# Patient Record
Sex: MALE | Race: NATIVE HAWAIIAN OR OTHER PACIFIC ISLANDER | URBAN - METROPOLITAN AREA
[De-identification: names, ages, dates, MRNs, and addresses within clinical notes are randomized per-mention and may not be internally consistent; named-entity substitution may affect disease eponyms.]

---

## 2022-04-26 ENCOUNTER — OFFICE VISIT (OUTPATIENT)
Dept: URBAN - METROPOLITAN AREA CLINIC 98 | Facility: CLINIC | Age: 42
End: 2022-04-26

## 2022-04-26 ENCOUNTER — WEB ENCOUNTER (OUTPATIENT)
Dept: URBAN - METROPOLITAN AREA CLINIC 98 | Facility: CLINIC | Age: 42
End: 2022-04-26

## 2022-04-26 VITALS
DIASTOLIC BLOOD PRESSURE: 77 MMHG | SYSTOLIC BLOOD PRESSURE: 120 MMHG | HEART RATE: 66 BPM | HEIGHT: 63 IN | TEMPERATURE: 97.3 F | WEIGHT: 142 LBS | BODY MASS INDEX: 25.16 KG/M2

## 2022-04-26 DIAGNOSIS — R10.13 EPIGASTRIC PAIN: ICD-10-CM

## 2022-04-26 DIAGNOSIS — K62.5 RECTAL BLEEDING: ICD-10-CM

## 2022-04-26 PROCEDURE — 99203 OFFICE O/P NEW LOW 30 MIN: CPT | Performed by: INTERNAL MEDICINE

## 2022-04-26 NOTE — HPI-TODAY'S VISIT:
Patient does not speak English EGD and colon done 2018 Had [facility] had h pylori breath test, negative pain epigastrium LUQ after meals

## 2022-08-23 ENCOUNTER — OFFICE VISIT (OUTPATIENT)
Dept: URBAN - METROPOLITAN AREA CLINIC 98 | Facility: CLINIC | Age: 42
End: 2022-08-23

## 2022-08-23 ENCOUNTER — WEB ENCOUNTER (OUTPATIENT)
Dept: URBAN - METROPOLITAN AREA CLINIC 98 | Facility: CLINIC | Age: 42
End: 2022-08-23

## 2022-08-23 VITALS
DIASTOLIC BLOOD PRESSURE: 63 MMHG | TEMPERATURE: 98.1 F | WEIGHT: 142.8 LBS | HEART RATE: 73 BPM | BODY MASS INDEX: 25.3 KG/M2 | SYSTOLIC BLOOD PRESSURE: 135 MMHG | HEIGHT: 63 IN

## 2022-08-23 DIAGNOSIS — K62.89 RECTAL PAIN: ICD-10-CM

## 2022-08-23 DIAGNOSIS — L29.0 PERIANAL PRURITUS: ICD-10-CM

## 2022-08-23 PROCEDURE — 99214 OFFICE O/P EST MOD 30 MIN: CPT | Performed by: INTERNAL MEDICINE

## 2022-08-23 RX ORDER — ANORECTAL OINTMENT 15.7; .44; 24; 20.6 G/100G; G/100G; G/100G; G/100G
AS DIRECTED OINTMENT TOPICAL TWICE A DAY
Qty: 10 | Refills: 1 | OUTPATIENT
Start: 2022-08-23 | End: 2022-09-20

## 2022-08-23 RX ORDER — SODIUM, POTASSIUM,MAG SULFATES 17.5-3.13G
354ML SOLUTION, RECONSTITUTED, ORAL ORAL
Qty: 345 MILLILITER | Refills: 0 | OUTPATIENT
Start: 2022-08-23 | End: 2022-08-24

## 2022-08-23 NOTE — HPI-TODAY'S VISIT:
Pt seen by Dr. Zhu. 43 yo pt w several weeks of perianal burning, w intermittent rectal bleeding, mostly on toilet paper w defecatory perianal discomfort, exacerbated by sitting, wo pain w defecation, occasional loose stools, w normal bm's qd mostly and no abdominal pain. He has a good appetite and no weight loss. Normal labs at [facility], no copy available. Lately, some LBP at times. Negative FIT @ [facility] No COVID-19 exposure and hasn't received COVID-19 vaccine. No other complaints.

## 2022-08-23 NOTE — PHYSICAL EXAM CONSTITUTIONAL:
well developed, well nourished , in no acute distress , ambulating without difficulty , normal communication ability
Retired

## 2022-08-24 ENCOUNTER — DASHBOARD ENCOUNTERS (OUTPATIENT)
Age: 42
End: 2022-08-24

## 2022-11-01 ENCOUNTER — TELEPHONE ENCOUNTER (OUTPATIENT)
Dept: URBAN - METROPOLITAN AREA CLINIC 63 | Facility: CLINIC | Age: 42
End: 2022-11-01

## 2022-11-21 ENCOUNTER — CLAIMS CREATED FROM THE CLAIM WINDOW (OUTPATIENT)
Dept: URBAN - METROPOLITAN AREA SURGERY CENTER 18 | Facility: SURGERY CENTER | Age: 42
End: 2022-11-21

## 2022-11-21 DIAGNOSIS — K62.89 ACUTE PROCTITIS: ICD-10-CM

## 2022-11-21 PROCEDURE — 45378 DIAGNOSTIC COLONOSCOPY: CPT | Performed by: INTERNAL MEDICINE

## 2022-11-21 PROCEDURE — G8907 PT DOC NO EVENTS ON DISCHARG: HCPCS | Performed by: INTERNAL MEDICINE

## 2022-12-05 ENCOUNTER — TELEPHONE ENCOUNTER (OUTPATIENT)
Dept: URBAN - METROPOLITAN AREA CLINIC 98 | Facility: CLINIC | Age: 42
End: 2022-12-05

## 2022-12-05 RX ORDER — HYDROCORTISONE ACETATE 25 MG/1
1 SUPPOSITORY SUPPOSITORY RECTAL
Qty: 7 | Refills: 0 | OUTPATIENT
Start: 2022-12-05 | End: 2022-12-12